# Patient Record
Sex: FEMALE | Race: WHITE | Employment: UNEMPLOYED | URBAN - METROPOLITAN AREA
[De-identification: names, ages, dates, MRNs, and addresses within clinical notes are randomized per-mention and may not be internally consistent; named-entity substitution may affect disease eponyms.]

---

## 2024-06-11 ENCOUNTER — HOSPITAL ENCOUNTER (INPATIENT)
Age: 54
LOS: 2 days | Discharge: HOME | End: 2024-06-13
Attending: EMERGENCY MEDICINE | Admitting: HOSPITALIST
Payer: COMMERCIAL

## 2024-06-11 ENCOUNTER — APPOINTMENT (OUTPATIENT)
Dept: CT IMAGING | Age: 54
End: 2024-06-11
Payer: COMMERCIAL

## 2024-06-11 DIAGNOSIS — J18.9 SEPSIS DUE TO PNEUMONIA (HCC): Primary | ICD-10-CM

## 2024-06-11 DIAGNOSIS — A41.9 SEPSIS DUE TO PNEUMONIA (HCC): Primary | ICD-10-CM

## 2024-06-11 DIAGNOSIS — N17.9 AKI (ACUTE KIDNEY INJURY) (HCC): ICD-10-CM

## 2024-06-11 DIAGNOSIS — J18.9 COMMUNITY ACQUIRED PNEUMONIA, UNSPECIFIED LATERALITY: ICD-10-CM

## 2024-06-11 DIAGNOSIS — E87.8 ELECTROLYTE DISTURBANCE: ICD-10-CM

## 2024-06-11 LAB
ALBUMIN SERPL-MCNC: 3.8 G/DL (ref 3.4–5)
ALBUMIN/GLOB SERPL: 1 {RATIO} (ref 1.1–2.2)
ALP SERPL-CCNC: 144 U/L (ref 40–129)
ALT SERPL-CCNC: 10 U/L (ref 10–40)
ANION GAP SERPL CALCULATED.3IONS-SCNC: 18 MMOL/L (ref 3–16)
ANISOCYTOSIS BLD QL SMEAR: ABNORMAL
AST SERPL-CCNC: 13 U/L (ref 15–37)
BASOPHILS # BLD: 0 K/UL (ref 0–0.2)
BASOPHILS NFR BLD: 0 %
BILIRUB SERPL-MCNC: 0.9 MG/DL (ref 0–1)
BUN SERPL-MCNC: 25 MG/DL (ref 7–20)
CALCIUM SERPL-MCNC: 10.5 MG/DL (ref 8.3–10.6)
CHLORIDE SERPL-SCNC: 93 MMOL/L (ref 99–110)
CO2 SERPL-SCNC: 21 MMOL/L (ref 21–32)
CREAT SERPL-MCNC: 2.5 MG/DL (ref 0.6–1.1)
DEPRECATED RDW RBC AUTO: 16.3 % (ref 12.4–15.4)
EOSINOPHIL # BLD: 0 K/UL (ref 0–0.6)
EOSINOPHIL NFR BLD: 0 %
GFR SERPLBLD CREATININE-BSD FMLA CKD-EPI: 23 ML/MIN/{1.73_M2}
GLUCOSE SERPL-MCNC: 96 MG/DL (ref 70–99)
HCT VFR BLD AUTO: 32.7 % (ref 36–48)
HGB BLD-MCNC: 11.2 G/DL (ref 12–16)
LACTATE BLDV-SCNC: 1.4 MMOL/L (ref 0.4–1.9)
LACTATE BLDV-SCNC: 2.8 MMOL/L (ref 0.4–1.9)
LIPASE SERPL-CCNC: 6 U/L (ref 13–60)
LYMPHOCYTES # BLD: 0.7 K/UL (ref 1–5.1)
LYMPHOCYTES NFR BLD: 6 %
MAGNESIUM SERPL-MCNC: 1.5 MG/DL (ref 1.8–2.4)
MCH RBC QN AUTO: 29.1 PG (ref 26–34)
MCHC RBC AUTO-ENTMCNC: 34.2 G/DL (ref 31–36)
MCV RBC AUTO: 85 FL (ref 80–100)
MONOCYTES # BLD: 0.7 K/UL (ref 0–1.3)
MONOCYTES NFR BLD: 7 %
NEUTROPHILS # BLD: 9.2 K/UL (ref 1.7–7.7)
NEUTROPHILS NFR BLD: 76 %
NEUTS BAND NFR BLD MANUAL: 10 % (ref 0–7)
PLATELET # BLD AUTO: 135 K/UL (ref 135–450)
PLATELET BLD QL SMEAR: ADEQUATE
PMV BLD AUTO: 8.5 FL (ref 5–10.5)
POLYCHROMASIA BLD QL SMEAR: ABNORMAL
POTASSIUM SERPL-SCNC: 3.1 MMOL/L (ref 3.5–5.1)
PROT SERPL-MCNC: 7.6 G/DL (ref 6.4–8.2)
RBC # BLD AUTO: 3.85 M/UL (ref 4–5.2)
SODIUM SERPL-SCNC: 132 MMOL/L (ref 136–145)
TROPONIN, HIGH SENSITIVITY: 6 NG/L (ref 0–14)
VARIANT LYMPHS NFR BLD MANUAL: 1 % (ref 0–6)
WBC # BLD AUTO: 10.7 K/UL (ref 4–11)

## 2024-06-11 PROCEDURE — 93005 ELECTROCARDIOGRAM TRACING: CPT | Performed by: EMERGENCY MEDICINE

## 2024-06-11 PROCEDURE — 83605 ASSAY OF LACTIC ACID: CPT

## 2024-06-11 PROCEDURE — 87040 BLOOD CULTURE FOR BACTERIA: CPT

## 2024-06-11 PROCEDURE — 71250 CT THORAX DX C-: CPT

## 2024-06-11 PROCEDURE — 2580000003 HC RX 258: Performed by: EMERGENCY MEDICINE

## 2024-06-11 PROCEDURE — 96375 TX/PRO/DX INJ NEW DRUG ADDON: CPT

## 2024-06-11 PROCEDURE — 80053 COMPREHEN METABOLIC PANEL: CPT

## 2024-06-11 PROCEDURE — 6360000002 HC RX W HCPCS: Performed by: EMERGENCY MEDICINE

## 2024-06-11 PROCEDURE — 85025 COMPLETE CBC W/AUTO DIFF WBC: CPT

## 2024-06-11 PROCEDURE — 96365 THER/PROPH/DIAG IV INF INIT: CPT

## 2024-06-11 PROCEDURE — 1200000000 HC SEMI PRIVATE

## 2024-06-11 PROCEDURE — 96361 HYDRATE IV INFUSION ADD-ON: CPT

## 2024-06-11 PROCEDURE — 83735 ASSAY OF MAGNESIUM: CPT

## 2024-06-11 PROCEDURE — 84484 ASSAY OF TROPONIN QUANT: CPT

## 2024-06-11 PROCEDURE — 6370000000 HC RX 637 (ALT 250 FOR IP): Performed by: EMERGENCY MEDICINE

## 2024-06-11 PROCEDURE — 83690 ASSAY OF LIPASE: CPT

## 2024-06-11 PROCEDURE — 99285 EMERGENCY DEPT VISIT HI MDM: CPT

## 2024-06-11 RX ORDER — KETOROLAC TROMETHAMINE 15 MG/ML
15 INJECTION, SOLUTION INTRAMUSCULAR; INTRAVENOUS ONCE
Status: COMPLETED | OUTPATIENT
Start: 2024-06-11 | End: 2024-06-11

## 2024-06-11 RX ORDER — 0.9 % SODIUM CHLORIDE 0.9 %
30 INTRAVENOUS SOLUTION INTRAVENOUS ONCE
Status: COMPLETED | OUTPATIENT
Start: 2024-06-11 | End: 2024-06-11

## 2024-06-11 RX ORDER — SODIUM CHLORIDE 0.9 % (FLUSH) 0.9 %
5-40 SYRINGE (ML) INJECTION PRN
Status: DISCONTINUED | OUTPATIENT
Start: 2024-06-11 | End: 2024-06-13 | Stop reason: HOSPADM

## 2024-06-11 RX ORDER — ENOXAPARIN SODIUM 100 MG/ML
30 INJECTION SUBCUTANEOUS DAILY
Status: DISCONTINUED | OUTPATIENT
Start: 2024-06-12 | End: 2024-06-12

## 2024-06-11 RX ORDER — ACETAMINOPHEN 325 MG/1
650 TABLET ORAL EVERY 6 HOURS PRN
Status: DISCONTINUED | OUTPATIENT
Start: 2024-06-11 | End: 2024-06-13 | Stop reason: HOSPADM

## 2024-06-11 RX ORDER — ONDANSETRON 2 MG/ML
4 INJECTION INTRAMUSCULAR; INTRAVENOUS EVERY 6 HOURS PRN
Status: DISCONTINUED | OUTPATIENT
Start: 2024-06-11 | End: 2024-06-13 | Stop reason: HOSPADM

## 2024-06-11 RX ORDER — ENOXAPARIN SODIUM 100 MG/ML
40 INJECTION SUBCUTANEOUS DAILY
Status: DISCONTINUED | OUTPATIENT
Start: 2024-06-12 | End: 2024-06-11

## 2024-06-11 RX ORDER — MAGNESIUM SULFATE 1 G/100ML
1000 INJECTION INTRAVENOUS ONCE
Status: COMPLETED | OUTPATIENT
Start: 2024-06-11 | End: 2024-06-11

## 2024-06-11 RX ORDER — ALBUTEROL SULFATE 2.5 MG/3ML
2.5 SOLUTION RESPIRATORY (INHALATION)
Status: DISCONTINUED | OUTPATIENT
Start: 2024-06-11 | End: 2024-06-13 | Stop reason: HOSPADM

## 2024-06-11 RX ORDER — SODIUM CHLORIDE 9 MG/ML
INJECTION, SOLUTION INTRAVENOUS PRN
Status: DISCONTINUED | OUTPATIENT
Start: 2024-06-11 | End: 2024-06-13 | Stop reason: HOSPADM

## 2024-06-11 RX ORDER — METHADONE HYDROCHLORIDE 5 MG/1
5 TABLET ORAL EVERY 4 HOURS PRN
Status: ON HOLD | COMMUNITY
End: 2024-06-13 | Stop reason: HOSPADM

## 2024-06-11 RX ORDER — POLYETHYLENE GLYCOL 3350 17 G/17G
17 POWDER, FOR SOLUTION ORAL DAILY PRN
Status: DISCONTINUED | OUTPATIENT
Start: 2024-06-11 | End: 2024-06-12

## 2024-06-11 RX ORDER — ACETAMINOPHEN 650 MG/1
650 SUPPOSITORY RECTAL EVERY 6 HOURS PRN
Status: DISCONTINUED | OUTPATIENT
Start: 2024-06-11 | End: 2024-06-13 | Stop reason: HOSPADM

## 2024-06-11 RX ORDER — IPRATROPIUM BROMIDE AND ALBUTEROL SULFATE 2.5; .5 MG/3ML; MG/3ML
1 SOLUTION RESPIRATORY (INHALATION)
Status: DISCONTINUED | OUTPATIENT
Start: 2024-06-12 | End: 2024-06-11

## 2024-06-11 RX ORDER — SODIUM CHLORIDE 9 MG/ML
INJECTION, SOLUTION INTRAVENOUS CONTINUOUS
Status: ACTIVE | OUTPATIENT
Start: 2024-06-12 | End: 2024-06-12

## 2024-06-11 RX ORDER — ONDANSETRON 4 MG/1
4 TABLET, ORALLY DISINTEGRATING ORAL EVERY 8 HOURS PRN
Status: DISCONTINUED | OUTPATIENT
Start: 2024-06-11 | End: 2024-06-13 | Stop reason: HOSPADM

## 2024-06-11 RX ORDER — 0.9 % SODIUM CHLORIDE 0.9 %
1000 INTRAVENOUS SOLUTION INTRAVENOUS ONCE
Status: COMPLETED | OUTPATIENT
Start: 2024-06-11 | End: 2024-06-11

## 2024-06-11 RX ORDER — NICOTINE 21 MG/24HR
1 PATCH, TRANSDERMAL 24 HOURS TRANSDERMAL DAILY PRN
Status: DISCONTINUED | OUTPATIENT
Start: 2024-06-11 | End: 2024-06-12

## 2024-06-11 RX ORDER — SODIUM CHLORIDE 0.9 % (FLUSH) 0.9 %
5-40 SYRINGE (ML) INJECTION EVERY 12 HOURS SCHEDULED
Status: DISCONTINUED | OUTPATIENT
Start: 2024-06-11 | End: 2024-06-13 | Stop reason: HOSPADM

## 2024-06-11 RX ORDER — GUAIFENESIN 600 MG/1
600 TABLET, EXTENDED RELEASE ORAL 2 TIMES DAILY
Status: DISCONTINUED | OUTPATIENT
Start: 2024-06-12 | End: 2024-06-13 | Stop reason: HOSPADM

## 2024-06-11 RX ORDER — SODIUM CHLORIDE 9 MG/ML
INJECTION, SOLUTION INTRAVENOUS CONTINUOUS
Status: DISCONTINUED | OUTPATIENT
Start: 2024-06-11 | End: 2024-06-11

## 2024-06-11 RX ADMIN — KETOROLAC TROMETHAMINE 15 MG: 15 INJECTION, SOLUTION INTRAMUSCULAR; INTRAVENOUS at 19:43

## 2024-06-11 RX ADMIN — SODIUM CHLORIDE 2607 ML: 9 INJECTION, SOLUTION INTRAVENOUS at 20:55

## 2024-06-11 RX ADMIN — MAGNESIUM SULFATE HEPTAHYDRATE 1000 MG: 1 INJECTION, SOLUTION INTRAVENOUS at 20:42

## 2024-06-11 RX ADMIN — POTASSIUM BICARBONATE 40 MEQ: 782 TABLET, EFFERVESCENT ORAL at 20:41

## 2024-06-11 RX ADMIN — CEFEPIME 2000 MG: 2 INJECTION, POWDER, FOR SOLUTION INTRAVENOUS at 21:14

## 2024-06-11 RX ADMIN — SODIUM CHLORIDE 1000 ML: 9 INJECTION, SOLUTION INTRAVENOUS at 19:36

## 2024-06-11 ASSESSMENT — ENCOUNTER SYMPTOMS
DIARRHEA: 0
WHEEZING: 1
NAUSEA: 1
ABDOMINAL PAIN: 0
VOMITING: 1
SHORTNESS OF BREATH: 1
COUGH: 1

## 2024-06-11 ASSESSMENT — PAIN SCALES - GENERAL: PAINLEVEL_OUTOF10: 8

## 2024-06-11 NOTE — ED TRIAGE NOTES
53y/o female presents to the ED with right side chest radiating to back onset 4days ago. Pt states +sob only when she is having chest pain. +productive cough with yellow sputum. +n/v/f/c. Pt also c/o h/a pt describes as the top of her head. Denies visual changes. +generalized weakness and fatigue.

## 2024-06-12 LAB
ALBUMIN SERPL-MCNC: 2.3 G/DL (ref 3.4–5)
ANION GAP SERPL CALCULATED.3IONS-SCNC: 10 MMOL/L (ref 3–16)
ANISOCYTOSIS BLD QL SMEAR: ABNORMAL
BACTERIA URNS QL MICRO: ABNORMAL /HPF
BASOPHILS # BLD: 0 K/UL (ref 0–0.2)
BASOPHILS NFR BLD: 0.1 %
BILIRUB UR QL STRIP.AUTO: NEGATIVE
BUN SERPL-MCNC: 23 MG/DL (ref 7–20)
CALCIUM SERPL-MCNC: 8.8 MG/DL (ref 8.3–10.6)
CHLORIDE SERPL-SCNC: 104 MMOL/L (ref 99–110)
CLARITY UR: CLEAR
CO2 SERPL-SCNC: 19 MMOL/L (ref 21–32)
COLOR UR: YELLOW
CREAT SERPL-MCNC: 1.6 MG/DL (ref 0.6–1.1)
CREAT UR-MCNC: 53.2 MG/DL (ref 28–259)
DACRYOCYTES BLD QL SMEAR: ABNORMAL
DEPRECATED RDW RBC AUTO: 16.7 % (ref 12.4–15.4)
EKG ATRIAL RATE: 125 BPM
EKG DIAGNOSIS: NORMAL
EKG P AXIS: 72 DEGREES
EKG P-R INTERVAL: 142 MS
EKG Q-T INTERVAL: 286 MS
EKG QRS DURATION: 70 MS
EKG QTC CALCULATION (BAZETT): 412 MS
EKG R AXIS: 67 DEGREES
EKG T AXIS: -37 DEGREES
EKG VENTRICULAR RATE: 125 BPM
EOSINOPHIL # BLD: 0 K/UL (ref 0–0.6)
EOSINOPHIL NFR BLD: 0.4 %
EPI CELLS #/AREA URNS HPF: ABNORMAL /HPF (ref 0–5)
FLUAV RNA RESP QL NAA+PROBE: NOT DETECTED
FLUBV RNA RESP QL NAA+PROBE: NOT DETECTED
GFR SERPLBLD CREATININE-BSD FMLA CKD-EPI: 38 ML/MIN/{1.73_M2}
GLUCOSE SERPL-MCNC: 69 MG/DL (ref 70–99)
GLUCOSE UR STRIP.AUTO-MCNC: NEGATIVE MG/DL
HCT VFR BLD AUTO: 30.8 % (ref 36–48)
HGB BLD-MCNC: 10.3 G/DL (ref 12–16)
HGB UR QL STRIP.AUTO: ABNORMAL
KETONES UR STRIP.AUTO-MCNC: NEGATIVE MG/DL
LEUKOCYTE ESTERASE UR QL STRIP.AUTO: NEGATIVE
LYMPHOCYTES # BLD: 1.1 K/UL (ref 1–5.1)
LYMPHOCYTES NFR BLD: 17.1 %
MCH RBC QN AUTO: 29.2 PG (ref 26–34)
MCHC RBC AUTO-ENTMCNC: 33.3 G/DL (ref 31–36)
MCV RBC AUTO: 87.9 FL (ref 80–100)
MONOCYTES # BLD: 0.4 K/UL (ref 0–1.3)
MONOCYTES NFR BLD: 6.5 %
NEUTROPHILS # BLD: 4.7 K/UL (ref 1.7–7.7)
NEUTROPHILS NFR BLD: 75.9 %
NITRITE UR QL STRIP.AUTO: NEGATIVE
ORGANISM: ABNORMAL
ORGANISM: ABNORMAL
OVALOCYTES BLD QL SMEAR: ABNORMAL
PH UR STRIP.AUTO: 6 [PH] (ref 5–8)
PHOSPHATE SERPL-MCNC: 2.6 MG/DL (ref 2.5–4.9)
PLATELET # BLD AUTO: 97 K/UL (ref 135–450)
PLATELET BLD QL SMEAR: ABNORMAL
PMV BLD AUTO: 8.2 FL (ref 5–10.5)
POTASSIUM SERPL-SCNC: 4.1 MMOL/L (ref 3.5–5.1)
PROT UR STRIP.AUTO-MCNC: ABNORMAL MG/DL
RBC # BLD AUTO: 3.51 M/UL (ref 4–5.2)
RBC #/AREA URNS HPF: ABNORMAL /HPF (ref 0–4)
REPORT: NORMAL
RESP PATH DNA+RNA PNL L RESP NAA+NON-PRB: ABNORMAL
RESP PATH DNA+RNA PNL L RESP NAA+NON-PRB: ABNORMAL
SARS-COV-2 RNA RESP QL NAA+PROBE: NOT DETECTED
SODIUM SERPL-SCNC: 133 MMOL/L (ref 136–145)
SP GR UR STRIP.AUTO: 1.01 (ref 1–1.03)
UA DIPSTICK W REFLEX MICRO PNL UR: YES
URN SPEC COLLECT METH UR: ABNORMAL
URN SPEC COLLECT METH UR: NORMAL
UROBILINOGEN UR STRIP-ACNC: 1 E.U./DL
WBC # BLD AUTO: 6.3 K/UL (ref 4–11)
WBC #/AREA URNS HPF: ABNORMAL /HPF (ref 0–5)

## 2024-06-12 PROCEDURE — 2580000003 HC RX 258

## 2024-06-12 PROCEDURE — 87636 SARSCOV2 & INF A&B AMP PRB: CPT

## 2024-06-12 PROCEDURE — 87633 RESP VIRUS 12-25 TARGETS: CPT

## 2024-06-12 PROCEDURE — 93010 ELECTROCARDIOGRAM REPORT: CPT | Performed by: INTERNAL MEDICINE

## 2024-06-12 PROCEDURE — 36415 COLL VENOUS BLD VENIPUNCTURE: CPT

## 2024-06-12 PROCEDURE — 6360000002 HC RX W HCPCS

## 2024-06-12 PROCEDURE — 87205 SMEAR GRAM STAIN: CPT

## 2024-06-12 PROCEDURE — 6370000000 HC RX 637 (ALT 250 FOR IP)

## 2024-06-12 PROCEDURE — 6360000002 HC RX W HCPCS: Performed by: HOSPITALIST

## 2024-06-12 PROCEDURE — 87070 CULTURE OTHR SPECIMN AEROBIC: CPT

## 2024-06-12 PROCEDURE — 1200000000 HC SEMI PRIVATE

## 2024-06-12 PROCEDURE — 87449 NOS EACH ORGANISM AG IA: CPT

## 2024-06-12 PROCEDURE — 2580000003 HC RX 258: Performed by: HOSPITALIST

## 2024-06-12 PROCEDURE — 2580000003 HC RX 258: Performed by: EMERGENCY MEDICINE

## 2024-06-12 PROCEDURE — 87641 MR-STAPH DNA AMP PROBE: CPT

## 2024-06-12 PROCEDURE — 82570 ASSAY OF URINE CREATININE: CPT

## 2024-06-12 PROCEDURE — 80069 RENAL FUNCTION PANEL: CPT

## 2024-06-12 PROCEDURE — 6360000002 HC RX W HCPCS: Performed by: EMERGENCY MEDICINE

## 2024-06-12 PROCEDURE — 81001 URINALYSIS AUTO W/SCOPE: CPT

## 2024-06-12 PROCEDURE — 85025 COMPLETE CBC W/AUTO DIFF WBC: CPT

## 2024-06-12 RX ORDER — HEPARIN SODIUM 5000 [USP'U]/ML
5000 INJECTION, SOLUTION INTRAVENOUS; SUBCUTANEOUS EVERY 8 HOURS SCHEDULED
Status: DISCONTINUED | OUTPATIENT
Start: 2024-06-12 | End: 2024-06-13 | Stop reason: HOSPADM

## 2024-06-12 RX ORDER — NICOTINE 21 MG/24HR
1 PATCH, TRANSDERMAL 24 HOURS TRANSDERMAL DAILY
Status: DISCONTINUED | OUTPATIENT
Start: 2024-06-12 | End: 2024-06-13 | Stop reason: HOSPADM

## 2024-06-12 RX ORDER — METHADONE HYDROCHLORIDE 10 MG/ML
73 CONCENTRATE ORAL DAILY
Status: DISCONTINUED | OUTPATIENT
Start: 2024-06-12 | End: 2024-06-13 | Stop reason: HOSPADM

## 2024-06-12 RX ORDER — POLYETHYLENE GLYCOL 3350 17 G/17G
17 POWDER, FOR SOLUTION ORAL DAILY
Status: DISCONTINUED | OUTPATIENT
Start: 2024-06-12 | End: 2024-06-13 | Stop reason: HOSPADM

## 2024-06-12 RX ADMIN — SODIUM CHLORIDE, PRESERVATIVE FREE 10 ML: 5 INJECTION INTRAVENOUS at 00:15

## 2024-06-12 RX ADMIN — Medication 73 MG: at 10:16

## 2024-06-12 RX ADMIN — AZITHROMYCIN MONOHYDRATE 500 MG: 500 INJECTION, POWDER, LYOPHILIZED, FOR SOLUTION INTRAVENOUS at 00:54

## 2024-06-12 RX ADMIN — SODIUM CHLORIDE: 9 INJECTION, SOLUTION INTRAVENOUS at 00:16

## 2024-06-12 RX ADMIN — WATER 1000 MG: 1 INJECTION INTRAMUSCULAR; INTRAVENOUS; SUBCUTANEOUS at 23:45

## 2024-06-12 RX ADMIN — GUAIFENESIN 600 MG: 600 TABLET ORAL at 10:11

## 2024-06-12 RX ADMIN — AZITHROMYCIN MONOHYDRATE 500 MG: 500 INJECTION, POWDER, LYOPHILIZED, FOR SOLUTION INTRAVENOUS at 23:52

## 2024-06-12 RX ADMIN — SODIUM CHLORIDE 1500 MG: 9 INJECTION, SOLUTION INTRAVENOUS at 00:45

## 2024-06-12 RX ADMIN — WATER 1000 MG: 1 INJECTION INTRAMUSCULAR; INTRAVENOUS; SUBCUTANEOUS at 00:17

## 2024-06-12 RX ADMIN — SODIUM CHLORIDE, PRESERVATIVE FREE 10 ML: 5 INJECTION INTRAVENOUS at 21:24

## 2024-06-12 RX ADMIN — HEPARIN SODIUM 5000 UNITS: 5000 INJECTION INTRAVENOUS; SUBCUTANEOUS at 21:24

## 2024-06-12 RX ADMIN — HEPARIN SODIUM 5000 UNITS: 5000 INJECTION INTRAVENOUS; SUBCUTANEOUS at 06:53

## 2024-06-12 RX ADMIN — HEPARIN SODIUM 5000 UNITS: 5000 INJECTION INTRAVENOUS; SUBCUTANEOUS at 14:46

## 2024-06-12 RX ADMIN — POLYETHYLENE GLYCOL 3350 17 G: 17 POWDER, FOR SOLUTION ORAL at 10:11

## 2024-06-12 RX ADMIN — GUAIFENESIN 600 MG: 600 TABLET ORAL at 02:35

## 2024-06-12 RX ADMIN — GUAIFENESIN 600 MG: 600 TABLET ORAL at 21:24

## 2024-06-12 ASSESSMENT — ENCOUNTER SYMPTOMS: PHOTOPHOBIA: 1

## 2024-06-12 NOTE — PROGRESS NOTES
Tenderness: There is abdominal tenderness. There is no guarding or rebound.      Comments: Right sided abdominal tenderness (RUQ and RLQ).   Musculoskeletal:         General: No swelling or tenderness.      Right lower leg: No edema.      Left lower leg: No edema.   Skin:     General: Skin is warm and dry.      Capillary Refill: Capillary refill takes less than 2 seconds.      Comments: Bandage overlying right antecubital fossa.  Peripheral IV access on left hand.   Neurological:      General: No focal deficit present.      Mental Status: She is alert and oriented to person, place, and time.   Psychiatric:         Mood and Affect: Mood normal.            Labs:  CBC:   Recent Labs     06/11/24 1939 06/12/24  0704   WBC 10.7 6.3   HGB 11.2* 10.3*   HCT 32.7* 30.8*    97*         BMP:   Recent Labs     06/11/24 1939 06/12/24  0704   * 133*   K 3.1* 4.1   CL 93* 104   CO2 21 19*   BUN 25* 23*   CREATININE 2.5* 1.6*   GLUCOSE 96 69*   PHOS  --  2.6       Magnesium:   Recent Labs     06/11/24 1939   MG 1.50*       LFT's:   Recent Labs     06/11/24 1939   AST 13*   ALT 10   BILITOT 0.9   ALKPHOS 144*           U/A:   Recent Labs     06/12/24  0216   COLORU Yellow   PHUR 6.0   WBCUA 6-9*   RBCUA 0-2   BACTERIA 2+*   CLARITYU Clear   LEUKOCYTESUR Negative   UROBILINOGEN 1.0   BILIRUBINUR Negative   BLOODU TRACE-INTACT*   GLUCOSEU Negative         Radiology:  CT CHEST ABDOMEN PELVIS WO CONTRAST Additional Contrast? None   Final Result      1.  Right upper lobe consolidation and mild scattered patchy groundglass opacities in the bilateral lungs compatible with pneumonia.   2.  Small hiatal hernia and sigmoid diverticulosis.         Electronically signed by Yousif Mariscal MD            Assessment & Plan   Xiomara Zaldivar is an 54 y.o. female w/ hx of methadone use who is admitted for sepsis 2/2 PNA.    Severe sepsis 2/2 community acquired PNA  Pleurisy  P/w tachycardia to 120s , lactic acidosis to 2.8 on admission,  improved to 1.4 after fluids, CBC w/ neutrophilia to 9.2 & bandemia to 10%. CT chest w/ right upper lobe consolidation and mild scattered patchy groundglass opacities in bilateral lungs compatible with pneumonia. Negative COVID and negative FLU A&B. Discontinued supplemental oxygen on 6/12/24. Tenderness in right chest and abdomen.  - ceftriaxone, azithromycin (6/11 - )  - will obtain MRSA nares, Ur legionella, Ur strep pneumo, molecular panel, respiratory culture,   - acapella, mucinex ordered   - blood cultures pending   - Follow up in resident clinic in one week.    CLARITZA & hypokalemia, hypomagnesemia  Cr 2.5 on admission. Likely ATN in the setting of decreased oral intake and sepsis. Electrolytes were replaced. Cr 1.6 and eGFR 38 6/12/24.  - UA, Ur sodium, Ur creatinine ordered  - will monitor creatinine  - NS 75/hr     Constipation  Last BM 3 days ago. Slightly distended abdomen.  - Glycolax 17g daily    Hypoglycemia  Glucose 69 on 6/12/24. Patient reports that she was unable to eat for several days because she felt unwell.  - Hypoglycemia protocol    Sigmoid diverticulitis  - incidental finding on CT chest abd pelv    Chronic medical conditions  Tobacco use - 37 pack year smoker  - nicotine patch ordered PRN  - Cessation counseling provided   - Low dose CT chest in 6 weeks    Methadone use: uses 73mg/day solution, ordered  - Patient [facility]adone from  [facility]    Will discuss with attending physician Dr. Brooks       DVT PPx: Heparin  Diet: ADULT DIET; Regular   Code status:  Full Code     ELOS: > 2 days  Barriers to discharge: Iv antibiotics  Disposition  - Preadmission: Home  - Current: GMF  - Upon discharge: TBD    Will discuss with attending physician Dr. Brooks, Jaqueline MITCHELL MD     _____________________  Alma Nunez DO, MS IV    I have personally seen and examined the patient independently. I have reviewed the patient's available data, including medical history and recent test results.

## 2024-06-12 NOTE — ED NOTES
Pt states she was feeling more sob. Pt O2 sat 88% on RA. MD notified. Pt placed on 2L of O2 per NC.

## 2024-06-12 NOTE — PROGRESS NOTES
4 Eyes Skin Assessment     NAME:  Xiomara Zaldivar  YOB: 1970  MEDICAL RECORD NUMBER:  [id number]    The patient is being assessed for  Admission    I agree that at least one RN has performed a thorough Head to Toe Skin Assessment on the patient. ALL assessment sites listed below have been assessed.      Areas assessed by both nurses:    Head, Face, Ears, Shoulders, Back, Chest, Arms, Elbows, Hands, Sacrum. Buttock, Coccyx, Ischium, Legs. Feet and Heels, and Under Medical Devices         Does the Patient have a Wound? No noted wound(s)       Josias Prevention initiated by RN: NO  Wound Care Orders initiated by RN: No    Pressure Injury (Stage 3,4, Unstageable, DTI, NWPT, and Complex wounds) if present, place Wound referral order by RN under : No    New Ostomies, if present place, Ostomy referral order under : No     Nurse 1 eSignature: Electronically signed by Riya Haskins RN on 6/12/24 at 3:07 AM EDT    **SHARE this note so that the co-signing nurse can place an eSignature**    Nurse 2 eSignature: Electronically signed by Ruby Boothe RN on 6/12/24 at 3:08 AM EDT

## 2024-06-12 NOTE — ED PROVIDER NOTES
EMERGENCY DEPARTMENT ENCOUNTER     [facility] EMERGENCY DEPARTMENT     Pt Name: Xiomara Zaldivar   MRN: [id number]   Birthdate 1970   Date of evaluation: 6/11/2024   Provider: Sarah Beth Dawson MD   PCP: No primary care provider on file.   Note Started: 9:12 PM EDT 6/11/24     CHIEF COMPLAINT:     Chief Complaint   Patient presents with    Chest Pain    Headache        HISTORY OF PRESENT ILLNESS:    Xiomara Pierce, 54-year-old female. Reason for visit: severe pain and feeling unwell. Symptoms started four days ago, constant, not coming and going.  Pain started as sudden onset of right-sided chest pain radiating to the back and also in the head.  Pain is sharp severe and constant. Vomiting started yesterday and continued today. Unable to eat due to feeling too sick. Fever reported, highest temperature 101°F two days ago, no current fever. Cough present, not consistent, producing greenish-yellow phlegm. Shortness of breath reported. No changes to vision or speech. No weakness on one side of the body. No medication taken for current symptoms. Dark urine reported, possible dehydration as she has not been drinking or eating well as she has had poor appetite and his being sleeping more than usual.. No prior similar issues reported. No abdominal surgeries. No burning sensation during urination. No alcohol or drug use reported.     PHYSICAL EXAM:    ED Triage Vitals [06/11/24 1913]   BP Temp Temp Source Pulse Respirations SpO2 Height Weight - Scale   (!) 115/59 99 °F (37.2 °C) Oral (!) 126 18 93 % -- 86.9 kg (191 lb 9.3 oz)        Physical Exam  Vitals and nursing note reviewed.   Constitutional:       Appearance: Normal appearance. She is well-developed. She is not ill-appearing.   HENT:      Head: Normocephalic and atraumatic.      Right Ear: External ear normal.      Left Ear: External ear normal.      Nose: Nose normal.   Eyes:      General: No scleral icterus.        Right eye: No discharge.         Left  patient's heart rate did decrease.  At 1 point she became briefly hypotensive but with fluids her blood pressure normalized.    ED MEDICATIONS GIVEN:   Medications   magnesium sulfate 1000 mg in dextrose 5% 100 mL IVPB (1,000 mg IntraVENous New Bag 6/11/24 2042)   ceFEPIme (MAXIPIME) 2,000 mg in sodium chloride 0.9 % 100 mL IVPB (mini-bag) (2,000 mg IntraVENous New Bag 6/11/24 2114)   vancomycin (VANCOCIN) 1,500 mg in sodium chloride 0.9 % 250 mL IVPB (Qloc1Ykb) (has no administration in time range)     Followed by   vancomycin (VANCOCIN) 750 mg in sodium chloride 0.9 % 250 mL IVPB ([id number]) (has no administration in time range)   ketorolac (TORADOL) injection 15 mg (15 mg IntraVENous Given 6/11/24 1943)   sodium chloride 0.9 % bolus 1,000 mL (1,000 mLs IntraVENous New Bag 6/11/24 1936)   potassium bicarb-citric acid (EFFER-K) effervescent tablet 40 mEq (40 mEq Oral Given 6/11/24 2041)   sodium chloride 0.9 % bolus 2,607 mL (2,607 mLs IntraVENous New Bag 6/11/24 2055)               Total Critical Care time was 40 minutes, excluding separately reportable procedures.  There was a high probability of clinically significant/life threatening deterioration in the patient's condition which required my urgent intervention.       CONSULTS:   Social Determinants:    Disposition Considerations:       I am the primary physician of Record.     FINAL IMPRESSION    1. Sepsis due to pneumonia ([facility])    2. CLARITZA (acute kidney injury) ([facility])    3. Electrolyte disturbance         DISPOSITION/PLAN   DISPOSITION Admitted 06/11/2024 09:14:42 PM       PATIENT REFERRED TO:   No follow-up provider specified.   DISCHARGE MEDICATIONS:   New Prescriptions    No medications on file      DISCONTINUED MEDICATIONS:   Discontinued Medications    No medications on file            (Please note that portions of this note were completed with a voice recognition program.  Efforts were made to edit the dictations but occasionally words are mis-transcribed.)

## 2024-06-12 NOTE — H&P
Internal Medicine  PGY 1  History & Physical  CC: SOB, R sided chest pain    History Obtained From:  patient    HISTORY OF PRESENT ILLNESS:  Xiomara Zaldivar is an 54 y.o. female w/ hx of methadone use who presents for sudden-onset, constant right sided chest pain with radiation to her back and head beginning 4 days prior to admission. The pain is worsened with deep inspiration.     She is not sure what she was doing when it started but she had not been lifting something heavy or straining herself. She may have just woken up from a nap. Her headache is on the top of her head and worsens when she coughs. The pain is described as \"stabbing.\" No neck pain. She has felt more tired over the last four days and has also tried to sleep because of the pain. Xiomara notes an intermittently productive cough with greenish sputum over the last two days. She also notes SOB and wheezing over the last couple days. She has had an intermittent fever, highest 101F without chills.     She has had some nausea and vomiting \"from not eating\" yesterday and once on the day of admission. She has vomited 3x. Xiomara has not been able to keep food down as a result. No congestion, diarrhea, dysuria, abdominal pain. However, her urine has been dark. No recent sick contacts.     She takes ibuprofen occasionally at home, Prilosec QD, 73mg of methadone once a day which she last took today. Xiomara gets her methadone from  [facility]. She smokes approximately 1PPD for >30 years without chronic cough. No Etoh use. Hx of opioid use >7 years. She does not see a PCP and has not had blood work in many years.     Discussed code status; she would like to be full code.     ED course:   Pt initially presented to [facility] ED for evaluation of R sided chest pain with radiation to the back and head. In the ED she was noted to be tachycardic to the 120s. CT CAP w/ contrast w/ Right upper lobe consolidation and mild scattered patchy groundglass

## 2024-06-12 NOTE — CARE COORDINATION
Case Management Assessment  Initial Evaluation    Date/Time of Evaluation: 6/12/2024 9:27 AM  Assessment Completed by: Cayla Rogers RN    If patient is discharged prior to next notation, then this note serves as note for discharge by case management.    Patient Name: Xiomara Zaldivar                   YOB: 1970  Diagnosis: Electrolyte disturbance [E87.8]  CLARITZA (acute kidney injury) (HCC) [N17.9]  Sepsis due to pneumonia (HCC) [J18.9, A41.9]  Community acquired pneumonia, unspecified laterality [J18.9]                   Date / Time: 6/11/2024  7:09 PM    Patient Admission Status: Inpatient   Readmission Risk (Low < 19, Mod (19-27), High > 27): Readmission Risk Score: 10.9    Current PCP: No primary care provider on file.  PCP verified by CM? No (will give PCP list)    Chart Reviewed: Yes      History Provided by: Patient  Patient Orientation: Alert and Oriented    Patient Cognition: Alert    Hospitalization in the last 30 days (Readmission):  No    If yes, Readmission Assessment in CM Navigator will be completed.    Advance Directives:      Code Status: Full Code   Patient's Primary Decision Maker is: Legal Next of Kin      Discharge Planning:    Patient lives with: Children, Family Members Type of Home: Apartment  Primary Care Giver: Self  Patient Support Systems include: Children, Family Members   Current Financial resources: None  Current community resources: None  Current services prior to admission: None            Current DME:              Type of Home Care services:  None    ADLS  Prior functional level: Independent in ADLs/IADLs  Current functional level: Independent in ADLs/IADLs    PT AM-PAC:   /24  OT AM-PAC:   /24    Family can provide assistance at DC: No  Would you like Case Management to discuss the discharge plan with any other family members/significant others, and if so, who? No  Plans to Return to Present Housing: Yes  Other Identified Issues/Barriers to RETURNING to current housing:  n/a  Potential Assistance needed at discharge: N/A            Potential DME:    Patient expects to discharge to: Apartment  Plan for transportation at discharge: Self    Financial    Payor: [employer] [place] / Plan: [employer] [place] / Product Type: *No Product type* /     Does insurance require precert for SNF: Yes    Potential assistance Purchasing Medications: No  Meds-to-Beds request:        [id number] [place], [place] - [address] P [phone] - F [phone]  [address] [facility] [address]  Phone: [phone] Fax: [phone]      Notes:    Factors facilitating achievement of predicted outcomes: Family support, Motivated, Cooperative, and Pleasant    Barriers to discharge: IVF's, IV ABX, wean off O2    Additional Case Management Notes: Patient is from home with family and is independent with all ADL's. She will have transport to home at d/c and denies needs from  for home. She does not have a PCP and gave a list to her to review.     The Plan for Transition of Care is related to the following treatment goals of Electrolyte disturbance [E87.8]  CLARITZA (acute kidney injury) (HCC) [N17.9]  Sepsis due to pneumonia (HCC) [J18.9, A41.9]  Community acquired pneumonia, unspecified laterality [J18.9]    IF APPLICABLE: The Patient and/or patient representative Xiomara and her family were provided with a choice of provider and agrees with the discharge plan. Freedom of choice list with basic dialogue that supports the patient's individualized plan of care/goals and shares the quality data associated with the providers was provided to: Patient   Patient Representative Name:       The Patient and/or Patient Representative Agree with the Discharge Plan? Yes    Cayla Rogers RN  Case Management Department  Ph: [phone] Fax: [phone]

## 2024-06-12 NOTE — PROGRESS NOTES
Patient AOX4  Vitals stable   O2 nasal canula at 1 L  Denies Pain and nausea  Patient up ad lali  Voiding clear, yellow urine, no BM this shift.   IV fluids infusing per order  Bed locked and in low position, wearing gripper socks when ambulating, side table and call light within reach.

## 2024-06-12 NOTE — PROGRESS NOTES
Internal Medicine Progress Note    Date: 6/12/2024   Patient: Xiomara Zaldivar   [facility] Day: 1      CC: Chest Pain and Headache       Interval Hx   Today, the patient still complains of right sided chest pain, intermittent cough productive of jorge alberto sputum, SOB which has improved on supplemental O2, and intermittent right sided abdominal pain.  She complains of constipations with her last bowel movement 3 days ago. She also complains of discolored urine.  She denies fever, chills, nausea, vomiting, pain with urination, suprapubic fullness.     HPI: Xiomara Zaldivar is an 54 y.o. female w/ hx of methadone use who presents for sudden-onset, constant right sided chest pain with radiation to her back and head beginning 4 days prior to admission. The pain is worsened with deep inspiration.     She is not sure what she was doing when it started but she had not been lifting something heavy or straining herself. She may have just woken up from a nap. Her headache is on the top of her head and worsens when she coughs. The pain is described as \"stabbing.\" No neck pain. She has felt more tired over the last four days and has also tried to sleep because of the pain. Xiomara notes an intermittently productive cough with greenish sputum over the last two days. She also notes SOB and wheezing over the last couple days. She had an intermittent fever, highest 101F without chills.     She had some nausea and vomiting \"from not eating\" yesterday and once on the day of admission. She has vomited 3x. Xiomara has not been able to keep food down as a result. No congestion, diarrhea, dysuria, abdominal pain. However, her urine has been dark. No recent sick contacts.     She takes ibuprofen occasionally at home, Prilosec QD, 73mg of methadone once a day which she last took today. Xiomara gets her methadone from  [facility] She smokes approximately 1PPD for >37 years without chronic cough. No Etoh use. Hx of opioid use  rebound.      Comments: Right sided abdominal tenderness (RUQ and RLQ).   Musculoskeletal:         General: No swelling or tenderness.      Right lower leg: No edema.      Left lower leg: No edema.   Skin:     General: Skin is warm and dry.      Capillary Refill: Capillary refill takes less than 2 seconds.      Comments: Bandage overlying right antecubital fossa.  Peripheral IV access on left hand.   Neurological:      General: No focal deficit present.      Mental Status: She is alert and oriented to person, place, and time.   Psychiatric:         Mood and Affect: Mood normal.            Labs:  CBC:   Recent Labs     06/11/24 1939 06/12/24  0704   WBC 10.7 6.3   HGB 11.2* 10.3*   HCT 32.7* 30.8*    97*       BMP:   Recent Labs     06/11/24 1939 06/12/24 0704   * 133*   K 3.1* 4.1   CL 93* 104   CO2 21 19*   BUN 25* 23*   CREATININE 2.5* 1.6*   GLUCOSE 96 69*   PHOS  --  2.6     Magnesium:   Recent Labs     06/11/24 1939   MG 1.50*     LFT's:   Recent Labs     06/11/24 1939   AST 13*   ALT 10   BILITOT 0.9   ALKPHOS 144*         U/A:   Recent Labs     06/12/24  0216   COLORU Yellow   PHUR 6.0   WBCUA 6-9*   RBCUA 0-2   BACTERIA 2+*   CLARITYU Clear   LEUKOCYTESUR Negative   UROBILINOGEN 1.0   BILIRUBINUR Negative   BLOODU TRACE-INTACT*   GLUCOSEU Negative       Radiology:  CT CHEST ABDOMEN PELVIS WO CONTRAST Additional Contrast? None   Final Result      1.  Right upper lobe consolidation and mild scattered patchy groundglass opacities in the bilateral lungs compatible with pneumonia.   2.  Small hiatal hernia and sigmoid diverticulosis.         Electronically signed by Yousif Mariscal MD            Assessment & Plan   Xiomara Zaldivar is an 54 y.o. female w/ hx of methadone use who is admitted for sepsis 2/2 PNA.    Severe sepsis 2/2 community acquired PNA  Pleurisy  P/w tachycardia to 120s , lactic acidosis to 2.8 on admission, improved to 1.4 after fluids, CBC w/ neutrophilia to 9.2 & bandemia to 10%.

## 2024-06-13 VITALS
RESPIRATION RATE: 16 BRPM | DIASTOLIC BLOOD PRESSURE: 76 MMHG | HEIGHT: 67 IN | HEART RATE: 101 BPM | SYSTOLIC BLOOD PRESSURE: 167 MMHG | BODY MASS INDEX: 30.07 KG/M2 | OXYGEN SATURATION: 98 % | TEMPERATURE: 98.6 F | WEIGHT: 191.58 LBS

## 2024-06-13 LAB
ALBUMIN SERPL-MCNC: 2.6 G/DL (ref 3.4–5)
ANION GAP SERPL CALCULATED.3IONS-SCNC: 7 MMOL/L (ref 3–16)
BASOPHILS # BLD: 0 K/UL (ref 0–0.2)
BASOPHILS NFR BLD: 0.2 %
BUN SERPL-MCNC: 13 MG/DL (ref 7–20)
CALCIUM SERPL-MCNC: 9.5 MG/DL (ref 8.3–10.6)
CHLORIDE SERPL-SCNC: 104 MMOL/L (ref 99–110)
CO2 SERPL-SCNC: 28 MMOL/L (ref 21–32)
CREAT SERPL-MCNC: 0.9 MG/DL (ref 0.6–1.1)
DEPRECATED RDW RBC AUTO: 16.5 % (ref 12.4–15.4)
EOSINOPHIL # BLD: 0.1 K/UL (ref 0–0.6)
EOSINOPHIL NFR BLD: 1.8 %
GFR SERPLBLD CREATININE-BSD FMLA CKD-EPI: 77 ML/MIN/{1.73_M2}
GLUCOSE SERPL-MCNC: 80 MG/DL (ref 70–99)
HCT VFR BLD AUTO: 28.3 % (ref 36–48)
HGB BLD-MCNC: 9.4 G/DL (ref 12–16)
LEGIONELLA AG UR QL: NORMAL
LYMPHOCYTES # BLD: 1.2 K/UL (ref 1–5.1)
LYMPHOCYTES NFR BLD: 23.1 %
MCH RBC QN AUTO: 29 PG (ref 26–34)
MCHC RBC AUTO-ENTMCNC: 33.4 G/DL (ref 31–36)
MCV RBC AUTO: 86.9 FL (ref 80–100)
MONOCYTES # BLD: 0.4 K/UL (ref 0–1.3)
MONOCYTES NFR BLD: 7.1 %
MRSA DNA SPEC QL NAA+PROBE: NORMAL
NEUTROPHILS # BLD: 3.5 K/UL (ref 1.7–7.7)
NEUTROPHILS NFR BLD: 67.8 %
PHOSPHATE SERPL-MCNC: 2.4 MG/DL (ref 2.5–4.9)
PLATELET # BLD AUTO: 127 K/UL (ref 135–450)
PMV BLD AUTO: 8.2 FL (ref 5–10.5)
POTASSIUM SERPL-SCNC: 3.1 MMOL/L (ref 3.5–5.1)
RBC # BLD AUTO: 3.25 M/UL (ref 4–5.2)
S PNEUM AG UR QL: NORMAL
SODIUM SERPL-SCNC: 139 MMOL/L (ref 136–145)
WBC # BLD AUTO: 5.2 K/UL (ref 4–11)

## 2024-06-13 PROCEDURE — 80069 RENAL FUNCTION PANEL: CPT

## 2024-06-13 PROCEDURE — 6370000000 HC RX 637 (ALT 250 FOR IP)

## 2024-06-13 PROCEDURE — 36415 COLL VENOUS BLD VENIPUNCTURE: CPT

## 2024-06-13 PROCEDURE — 85025 COMPLETE CBC W/AUTO DIFF WBC: CPT

## 2024-06-13 PROCEDURE — 6370000000 HC RX 637 (ALT 250 FOR IP): Performed by: INTERNAL MEDICINE

## 2024-06-13 PROCEDURE — 6360000002 HC RX W HCPCS

## 2024-06-13 PROCEDURE — 2580000003 HC RX 258: Performed by: HOSPITALIST

## 2024-06-13 RX ORDER — CEFDINIR 300 MG/1
300 CAPSULE ORAL 2 TIMES DAILY
Qty: 10 CAPSULE | Refills: 0 | Status: SHIPPED | OUTPATIENT
Start: 2024-06-13 | End: 2024-06-13

## 2024-06-13 RX ORDER — NICOTINE 21 MG/24HR
1 PATCH, TRANSDERMAL 24 HOURS TRANSDERMAL DAILY
Qty: 30 PATCH | Refills: 0 | Status: SHIPPED | OUTPATIENT
Start: 2024-06-14

## 2024-06-13 RX ORDER — POTASSIUM CHLORIDE 20 MEQ/1
20 TABLET, EXTENDED RELEASE ORAL ONCE
Status: COMPLETED | OUTPATIENT
Start: 2024-06-13 | End: 2024-06-13

## 2024-06-13 RX ORDER — BUTALBITAL, ACETAMINOPHEN AND CAFFEINE 50; 325; 40 MG/1; MG/1; MG/1
1 TABLET ORAL EVERY 4 HOURS PRN
Qty: 20 TABLET | Refills: 0 | Status: SHIPPED | OUTPATIENT
Start: 2024-06-13

## 2024-06-13 RX ORDER — POTASSIUM CHLORIDE 20 MEQ/1
40 TABLET, EXTENDED RELEASE ORAL ONCE
Status: COMPLETED | OUTPATIENT
Start: 2024-06-13 | End: 2024-06-13

## 2024-06-13 RX ORDER — CEFDINIR 300 MG/1
300 CAPSULE ORAL 2 TIMES DAILY
Qty: 10 CAPSULE | Refills: 0 | Status: SHIPPED | OUTPATIENT
Start: 2024-06-13 | End: 2024-06-18

## 2024-06-13 RX ORDER — NICOTINE 21 MG/24HR
1 PATCH, TRANSDERMAL 24 HOURS TRANSDERMAL DAILY
Qty: 30 PATCH | Refills: 0 | Status: SHIPPED | OUTPATIENT
Start: 2024-06-14 | End: 2024-06-13

## 2024-06-13 RX ADMIN — SODIUM CHLORIDE, PRESERVATIVE FREE 10 ML: 5 INJECTION INTRAVENOUS at 09:56

## 2024-06-13 RX ADMIN — POTASSIUM CHLORIDE 20 MEQ: 1500 TABLET, EXTENDED RELEASE ORAL at 13:54

## 2024-06-13 RX ADMIN — Medication 73 MG: at 10:07

## 2024-06-13 RX ADMIN — POTASSIUM CHLORIDE 40 MEQ: 1500 TABLET, EXTENDED RELEASE ORAL at 09:55

## 2024-06-13 RX ADMIN — HEPARIN SODIUM 5000 UNITS: 5000 INJECTION INTRAVENOUS; SUBCUTANEOUS at 05:59

## 2024-06-13 RX ADMIN — GUAIFENESIN 600 MG: 600 TABLET ORAL at 09:55

## 2024-06-13 RX ADMIN — POLYETHYLENE GLYCOL 3350 17 G: 17 POWDER, FOR SOLUTION ORAL at 09:55

## 2024-06-13 NOTE — CARE COORDINATION
Case Management Assessment            Discharge Note                    Date / Time of Note: 6/13/2024 11:01 AM                  Discharge Note Completed by: Cayla Rogers RN    Patient Name: Xiomara Zaldivar   YOB: 1970  Diagnosis: Electrolyte disturbance [E87.8]  CLARITZA (acute kidney injury) (HCC) [N17.9]  Sepsis due to pneumonia (HCC) [J18.9, A41.9]  Community acquired pneumonia, unspecified laterality [J18.9]   Date / Time: 6/11/2024  7:09 PM    Current PCP: No primary care provider on file.  Clinic patient: No    Hospitalization in the last 30 days: No       Advance Directives:  Code Status: Full Code  [place] DNR form completed and on chart: No    Financial:  Payor: [employer] [place] / Plan: [employer] [place] / Product Type: *No Product type* /      Pharmacy:    [id number] [address] [phone] - F [phone]  [address] [place] [id number]  Phone: [phone] Fax: [phone]      Assistance purchasing medications?: Potential Assistance Purchasing Medications: No  Assistance provided by Case Management: None at this time    Does patient want to participate in local refill/ meds to beds program?:      Meds To Beds General Rules:  1. Can ONLY be done Monday- Friday between 8:30am-5pm  2. Prescription(s) must be in pharmacy by 3pm to be filled same day  3.Copy of patient's insurance/ prescription drug card and patient face sheet must be sent along with the prescription(s)  4. Cost of Rx cannot be added to hospital bill. If financial assistance is needed, please contact unit  or ;  or  CANNOT provide pharmacy voucher for patients co-pays  5. Patients can then  the prescription on their way out of the hospital at discharge, or pharmacy can deliver to the bedside if staff is available. (payment due at time of pick-up or delivery - cash, check, or card accepted)     Able  to afford home medications/ co-pay costs: Yes    ADLS:  Current PT AM-PAC Score:   /24  Current OT AM-PAC Score:   /24    DISCHARGE Disposition: Home- No Services Needed    LOC at discharge: Not Applicable  GRIFFIN Completed: No    Notification completed in HENS/PAS?:  No    IMM Completed:   No         Transportation:  Transportation PLAN for discharge: family   Mode of Transport: Private Car  Reason for medical transport: Not Applicable  Name of Transport Company: Not Applicable  Time of Transport: TBD    Additional CM Notes: Patient cleared for d/c to home. PCP list given and no further CM needs.     COVID Result:    Lab Results   Component Value Date/Time    COVID19 NOT DETECTED 06/12/2024 02:45 AM       The Plan for Transition of Care is related to the following treatment goals of Electrolyte disturbance [E87.8]  CLARITZA (acute kidney injury) (HCC) [N17.9]  Sepsis due to pneumonia (HCC) [J18.9, A41.9]  Community acquired pneumonia, unspecified laterality [J18.9]    The Patient and/or patient representative Xiomara and her family were provided with a choice of provider and agrees with the discharge plan Yes    Freedom of choice list was provided with basic dialogue that supports the patient's individualized plan of care/goals and shares the quality data associated with the providers. Yes    Care Transitions patient: No    Cayla Rogers RN  [facility]  Case Management Department  Ph: [phone]  Fax: [phone]

## 2024-06-13 NOTE — DISCHARGE SUMMARY
INTERNAL MEDICINE DEPARTMENT AT THE [facility]  DISCHARGE SUMMARY    Patient ID: Xiomara Zaldivar                                             Discharge Date: 6/13/2024   Patient's PCP: No primary care provider on file.                                          Discharge Physician: Jaqueline Brooks MD  Admit Date: 6/11/2024   Admitting Physician: Marifer Sellers MD    PROBLEMS DURING HOSPITALIZATION:  Present on Admission:   Community acquired pneumonia, unspecified laterality      DISCHARGE DIAGNOSES:    Community acquired pneumonia    Hospital Course:  Patient initially presented to [facility] ED for evaluation of R sided chest pain with radiation to the back and head. In the ED she was noted to be tachycardic to the 120s. CT CAP w/ contrast w/ Right upper lobe consolidation and mild scattered patchy groundglass opacities in the bilateral lungs compatible with pneumonia. Given her severe sepsis, she was administered sepsis bolus and was transferred here for admission. On admission she was started on IV fluids, IV ceftriaxone and IV azithromycin. Patient complained of right sided chest pain, intermittent cough productive of jorge alberto sputum, SOB which improved on supplemental O2, and intermittent right sided abdominal pain. She also complained of constipation which was improved with glycolax.. She also complained of discolored urine which improved. S. Pneumo and H. Flu were positive on PNA panel. IV antibiotics and fluids were continued for 3 days with improvement.  The patient was successfully weaned from supplemental O2. Patient will be discharged on PO cefdinir for 5 days.    HPI: Xiomara Zaldivar is an 54 y.o. female w/ hx of methadone use who presents for sudden-onset, constant right sided chest pain with radiation to her back and head beginning 4 days prior to admission. The pain is worsened with deep inspiration.      She is not sure what she was doing when it started but she had not been lifting something heavy or  Normal rate.      Pulses: Normal pulses.   Pulmonary:      Effort: Pulmonary effort is normal.      Breath sounds: Normal breath sounds.   Abdominal:      General: There is no distension.      Palpations: Abdomen is soft.      Tenderness: There is no abdominal tenderness.   Musculoskeletal:         General: No swelling.   Skin:     General: Skin is warm and dry.   Neurological:      Mental Status: She is alert and oriented to person, place, and time.         Consults: none  Significant Diagnostic Studies:  CT scan abdomen  Treatments: IV hydration and antibiotics: ceftriaxone and azithromycin  Disposition: home  Discharged Condition: Stable  Follow Up: [facility] one week. Low dose CT chest in 6 weeks.     DISCHARGE MEDICATION:     Medication List        ASK your doctor about these medications      butalbital-acetaminophen-caffeine -40 MG per tablet  Commonly known as: Fioricet  Take 1 tablet by mouth every 4 hours as needed for Pain for 20 doses.     methadone 5 MG tablet  Commonly known as: DOLOPHINE            Activity: activity as tolerated  Diet: regular diet  Wound Care: none needed    Time Spent on discharge is more than 30 minutes      Discussed with Dr. Brooks and John Grijalva MS IV  6/13/2024   Shiloh Pate,   PGY-1

## 2024-06-13 NOTE — PLAN OF CARE
Problem: Discharge Planning  Goal: Discharge to home or other facility with appropriate resources  6/13/2024 1444 by Angel Block RN  Outcome: Adequate for Discharge  6/13/2024 1444 by Angel Block RN  Outcome: Progressing  6/13/2024 1416 by nAgel Block RN  Outcome: Progressing     Problem: Pain  Goal: Verbalizes/displays adequate comfort level or baseline comfort level  6/13/2024 1444 by Angel Block RN  Outcome: Adequate for Discharge  6/13/2024 1444 by Angel Block RN  Outcome: Progressing  6/13/2024 1416 by Angel Block RN  Outcome: Progressing

## 2024-06-13 NOTE — DISCHARGE INSTRUCTIONS
- Please take your antibiotics as prescribed  - Please take all other medications as prescribed by your PCP  - Please establish care with the [facility] as your new primary care provider, referral has been sent    - If you experience new or worsening symptoms please seek urgent medical attention

## 2024-06-13 NOTE — PROGRESS NOTES
Pt ao4, vss. Denied any pain, n/v, sob. Pt remains on 1L, attempted to wean her off, SpO2 dropped to 90% in RA. Other than that, pt is free from any injury/harm this shift. No bm noted. Pt voids w sufficient amount. Pt is currently resting comfortably in bed.

## 2024-06-13 NOTE — PLAN OF CARE
Problem: Discharge Planning  Goal: Discharge to home or other facility with appropriate resources  6/13/2024 1444 by Angel Block, RN  Outcome: Progressing  6/13/2024 1416 by Angel Block RN  Outcome: Progressing     Problem: Pain  Goal: Verbalizes/displays adequate comfort level or baseline comfort level  6/13/2024 1444 by Angel Block, RN  Outcome: Progressing  6/13/2024 1416 by Angel Block, RN  Outcome: Progressing

## 2024-06-14 LAB
BACTERIA SPEC RESP CULT: NORMAL
GRAM STN SPEC: NORMAL

## 2024-06-16 LAB
BACTERIA BLD CULT ORG #2: NORMAL
BACTERIA BLD CULT: NORMAL